# Patient Record
Sex: FEMALE | Race: WHITE
[De-identification: names, ages, dates, MRNs, and addresses within clinical notes are randomized per-mention and may not be internally consistent; named-entity substitution may affect disease eponyms.]

---

## 2020-07-27 ENCOUNTER — HOSPITAL ENCOUNTER (EMERGENCY)
Dept: HOSPITAL 74 - JER | Age: 20
Discharge: HOME | End: 2020-07-27
Payer: COMMERCIAL

## 2020-07-27 VITALS — SYSTOLIC BLOOD PRESSURE: 119 MMHG | HEART RATE: 101 BPM | TEMPERATURE: 98.2 F | DIASTOLIC BLOOD PRESSURE: 70 MMHG

## 2020-07-27 VITALS — BODY MASS INDEX: 22 KG/M2

## 2020-07-27 DIAGNOSIS — S01.01XA: Primary | ICD-10-CM

## 2020-07-27 PROCEDURE — 3E0234Z INTRODUCTION OF SERUM, TOXOID AND VACCINE INTO MUSCLE, PERCUTANEOUS APPROACH: ICD-10-PCS

## 2020-07-27 NOTE — PDOC
Documentation entered by Mendoza Abel SCRIBE, acting as scribe for 

Donna Bunn MD.








Donna Bunn MD:  This documentation has been prepared by the rakelibColten calixto Alexis, SCRIBE, under my direction and personally reviewed by me in its

entirety.  I confirm that the documentation accurately reflects all work, 

treatment, procedures, and medical decision making performed by me.  





Attending Attestation





- Resident


Resident Name: Dk Cardenas





- ED Attending Attestation


I have performed the following: I have examined & evaluated the patient, The 

case was reviewed & discussed with the resident, I agree w/resident's findings &

plan, Exceptions are as noted





- HPI


HPI: 





07/27/20 00:59


The patient is a 19 year old female with no significant past medical history who

presents to the ED for evaluation of a laceration on the right side of her head 

obtained tonight s/p a fall secondary to dizziness/loss of balance. Patient 

reports having one drink tonight and playing the game dizzy bats which invol

ves intentionally spinning around a baseball bat to induce dizziness.


 


The patient denies LOC, chest pain and shortness of breath. Denies fever, 

chills, nausea, vomiting, and/or any GI symptoms. Denies any  symptoms. Denies

any other symptoms. 





Allergies: NKDA














- Physicial Exam


PE: 





07/27/20 01:06


General: well appearing


HEENT: ~2cm laceration to R scalp without active bleeding, no other lesions or 

bruising, no racoon eyes, no dickey sign


Neuro: awake, alert, oriented x3, speech fluent, face symmetric, gait steady, no

focal deficits





- Medical Decision Making





07/27/20 01:07


18 yo F with simple scalp lac, clinically sober, no neurologic deficits. 





Plan:


-lac repair


-tetanus


-d/c with return precautions, return for staple removal





This clinical encounter is taking place during a federal and state health care 

emergency attributable to the novel Corona Virus pandemic.


The  of the Department of Health and Human Services has declared, 

pursuant to the Public Health Service Act  319F-3 (42 [place]  247d-6d), that a

covered persons activities related to medical countermeasures against COVID-19 

will be immune from liability under Federal and State law.





Discharge





- Discharge Information


Problems reviewed: Yes


Clinical Impression/Diagnosis: 


Laceration of head


Qualifiers:


 Encounter type: initial encounter Location of open wound of head: scalp Foreign

body presence: without foreign body Qualified Code(s): S01.01XA - Laceration 

without foreign body of scalp, initial encounter





Condition: Improved


Disposition: HOME





- Follow up/Referral





- Patient Discharge Instructions


Patient Printed Discharge Instructions:  How to Care for a Surgical Wound-

[place]


Additional Instructions: 


Your head laceration was closed with 2 staples





Keep the wound dry for the first 24 hours. You can rinse with warm water and 

soap after.





Take tylenol or ibuprofen if you have pain





Come back to the ED in 7-10 days to remove the staples





Return to the ED earlier if you are vomiting, worsening headache, vision 

changes, or the wound becomes infected





- Post Discharge Activity

## 2020-07-27 NOTE — PDOC
History of Present Illness





- General


Chief Complaint: Laceration


Stated Complaint: HEAD LACERATION


Time Seen by Provider: 07/27/20 00:34


History Source: Patient


Exam Limitations: No Limitations





- History of Present Illness


Initial Comments: 





07/27/20 01:03


19 year old previously healthy female presenting w R scalp lac s/p fall. 

Endorses having 1 drink last night, playing dizzy bat which made pt dizzy and 

lose balance. Denies LOC, vision changes, headache, n/v. Doesn't remember last 

tdap.





Past History





- Medical History


Allergies/Adverse Reactions: 


                                    Allergies











Allergy/AdvReac Type Severity Reaction Status Date / Time


 


No Known Allergies Allergy   Verified 07/27/20 00:39














**Review of Systems





- Review of Systems


Constitutional: No: Chills, Fever


HEENTM: No: Eye Pain, Ear Discharge


Respiratory: No: Cough, Shortness of Breath


Cardiac (ROS): No: Chest Pain, Lightheadedness


ABD/GI: No: Nausea, Vomiting


: No: Burning, Dysuria


Musculoskeletal: No: Back Pain, Joint Pain


Integumentary: No: Bruising, Dryness


Neurological: No: Headache, Seizure


Psychiatric: No: Anxiety, Depression


Endocrine: No: Intolerance to Cold, Intolerance to Heat


Hematologic/Lymphatic: No: Anemia, Blood Clots





*Physical Exam





- Physical Exam


General Appearance: Yes: Nourished, Appropriately Dressed, Mild Distress


HEENT: positive: EOMI, RAJAT, Normal Voice, Hearing Grossly Normal.  negative: 

Scleral Icterus (R), Scleral Icterus (L)


Respiratory/Chest: positive: Lungs Clear, Normal Breath Sounds.  negative: Chest

Tender, Respiratory Distress


Cardiovascular: positive: Regular Rhythm, Regular Rate, S1, S2.  negative: 

Edema, Murmur


Gastrointestinal/Abdominal: positive: Normal Bowel Sounds, Flat, Soft.  

negative: Tender, Organomegaly


Integumentary: positive: Normal Color, Warm


Neurologic: positive: CNs II-XII NML intact, Fully Oriented, Alert, Normal 

Mood/Affect, Normal Response, Motor Strength 5/5, Responsive, Other (stable 

gait).  negative: Numbness, Confused, Disoriented





Medical Decision Making





- Medical Decision Making





07/27/20 01:12


19 year old previously healthy female presenting w R scalp lac s/p fall. Wound 

thoroughly irrigated, closed w 2 staples, covered w bacitracin. Given tdap.





Pt AOx4, non slurred speech, stable gait, clinically sober





DC home w wound care f/u








Discharge





- Discharge Information


Problems reviewed: Yes


Clinical Impression/Diagnosis: 


Laceration of head


Qualifiers:


 Encounter type: initial encounter Location of open wound of head: scalp Foreign

body presence: without foreign body Qualified Code(s): S01.01XA - Laceration 

without foreign body of scalp, initial encounter





Condition: Improved


Disposition: HOME





- Follow up/Referral





- Patient Discharge Instructions


Patient Printed Discharge Instructions:  How to Care for a Surgical Wound-

[place]


Additional Instructions: 


Your head laceration was closed with 2 staples





Keep the wound dry for the first 24 hours. You can rinse with warm water and 

soap after.





Take tylenol or ibuprofen if you have pain





Come back to the ED in 7-10 days to remove the staples





Return to the ED earlier if you are vomiting, worsening headache, vision 

changes, or the wound becomes infected





- Post Discharge Activity

## 2020-08-03 ENCOUNTER — HOSPITAL ENCOUNTER (EMERGENCY)
Dept: HOSPITAL 74 - JER | Age: 20
Discharge: HOME | End: 2020-08-03
Payer: COMMERCIAL

## 2020-08-03 VITALS — DIASTOLIC BLOOD PRESSURE: 81 MMHG | SYSTOLIC BLOOD PRESSURE: 121 MMHG | TEMPERATURE: 97.8 F | HEART RATE: 105 BPM

## 2020-08-03 VITALS — BODY MASS INDEX: 21.2 KG/M2

## 2020-08-03 DIAGNOSIS — Z48.02: Primary | ICD-10-CM

## 2020-08-03 NOTE — PDOC
Suture Removal/Wound Check HPI





- History of Present Illness


Chief Complaint: Suture/Staple Removal(Here)


Stated Complaint: REMOVAL OF STITCHES


Time Seen by Provider: 08/03/20 14:20


History Source: Yes: Patient


Exam Limitations: Yes: No Limitations


Treated at: [facility] ED





- Previous ED Treatment


Tetanus Immunization: Yes: Up to Date


Antibiotics Prescribed: No





Past History





- Travel History


Traveled outside of the country in the last 30 days: No


Close contact w/someone who was outside of country & ill: No





- Medical History


Allergies/Adverse Reactions: 


                                    Allergies











Allergy/AdvReac Type Severity Reaction Status Date / Time


 


No Known Allergies Allergy   Verified 07/27/20 00:39














- Psycho-Social/Smoking History


Patient Lives Alone: No


Lives with/in: parents


Smoking History: Never smoked





Suture Removal/Wound Check PE





- Physical Exam


Laceration/Wound Check Symptoms: reports: None


Current Severity Level: Mild


Pain Localization: None


Location of Laceration/Wound: right: Head


Pain Radiation: None





*Review of Systems





- Review of Systems


Able to Perform ROS?: Yes


Constitutional: No: Symptoms Reported


HEENTM: No: Symptoms Reported


Respiratory: No: Symptoms reported


Cardiac (ROS): No: Lightheadedness


Musculoskeletal: No: Symptoms Reported


Integumentary: No: Symptoms Reported


Neurological: No: Symptoms reported





*Physical Exam





- Physical Exam


General Appearance: Yes: Nourished, Appropriately Dressed.  No: Apparent Dis

tress


HEENT: positive: EOMI


Neck: negative: Decreased range of motion


Gastrointestinal/Abdominal: negative: Distended


Extremity: positive: Normal Inspection


Integumentary: positive: Normal Color, Warm, Moist


Neurologic: positive: Motor Strength 5/5 (ambulatory)





Medical Decision Making





- Medical Decision Making





08/03/20 14:21


CC: here for staple removal


no complaints


Exam: dry intact lac 


Plan: removed 2 staples





Discharge





- Discharge Information


Problems reviewed: Yes


Clinical Impression/Diagnosis: 


 Removal of staple





Condition: Good


Disposition: HOME





- Follow up/Referral





- Patient Discharge Instructions


Additional Instructions: 


keep area clean and dry.


may wash gently





- Post Discharge Activity